# Patient Record
Sex: FEMALE | Race: WHITE | URBAN - METROPOLITAN AREA
[De-identification: names, ages, dates, MRNs, and addresses within clinical notes are randomized per-mention and may not be internally consistent; named-entity substitution may affect disease eponyms.]

---
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
## 2018-07-20 ENCOUNTER — OFFICE VISIT (OUTPATIENT)
Dept: OPHTHALMOLOGY | Facility: CLINIC | Age: 18
End: 2018-07-20
Payer: COMMERCIAL

## 2018-07-20 DIAGNOSIS — H52.223 REGULAR ASTIGMATISM OF BOTH EYES: ICD-10-CM

## 2018-07-20 DIAGNOSIS — H47.22: ICD-10-CM

## 2018-07-20 DIAGNOSIS — H52.13 SEVERE MYOPIA OF BOTH EYES: ICD-10-CM

## 2018-07-20 DIAGNOSIS — Z83.511 FAMILY HISTORY OF GLAUCOMA IN MOTHER: ICD-10-CM

## 2018-07-20 DIAGNOSIS — H40.1134 PRIMARY OPEN ANGLE GLAUCOMA OF BOTH EYES, INDETERMINATE STAGE: Primary | ICD-10-CM

## 2018-07-20 PROCEDURE — 92015 DETERMINE REFRACTIVE STATE: CPT | Performed by: OPHTHALMOLOGY

## 2018-07-20 PROCEDURE — 92014 COMPRE OPH EXAM EST PT 1/>: CPT | Performed by: OPHTHALMOLOGY

## 2018-07-20 PROCEDURE — 92133 CPTRZD OPH DX IMG PST SGM ON: CPT | Performed by: OPHTHALMOLOGY

## 2018-07-20 RX ORDER — LATANOPROST 50 UG/ML
SOLUTION/ DROPS OPHTHALMIC
Qty: 3 BOTTLE | Refills: 3 | Status: SHIPPED | OUTPATIENT
Start: 2018-07-20 | End: 2018-08-02

## 2018-07-20 NOTE — PATIENT INSTRUCTIONS
High myopia  New glasses. Same contacts    Autosomal dominant optic atrophy type 1  Stable    Astigmatism of both eyes  New glasses.  Same contacts    Family history of glaucoma in mother  Mom with glaucoma and AD optic atrophy    Primary open angle glaucom

## 2018-07-20 NOTE — PROGRESS NOTES
Darren Carney is a 21year old female. HPI:     HPI     EP/ 20 yo F here for complete exam.  LDE: 7/17/17  Last VF and OCT: 12/18/17    She has a hx of POAG OU and is taking Latanoprost OU qhs.   She also has a hx of autosomal dominant optic atrophy ty Tonometry (Applanation, 10:14 AM)       Right Left    Pressure 11 12          Pachymetry (7/27/2015)       Right Left    Thickness 566/-1 571/-1          Pupils       Pupils APD    Right PERRL None    Left PERRL None          Visual Fields       L High myopia  New glasses. Same contacts    Autosomal dominant optic atrophy type 1  Stable    Astigmatism of both eyes  New glasses.  Same contacts    Family history of glaucoma in mother  Mom with glaucoma and AD optic atrophy    Primary open angle gla

## 2018-08-02 RX ORDER — LATANOPROST 50 UG/ML
SOLUTION/ DROPS OPHTHALMIC
Qty: 2.5 ML | Refills: 11 | Status: SHIPPED | OUTPATIENT
Start: 2018-08-02 | End: 2019-07-26

## 2019-07-26 ENCOUNTER — OFFICE VISIT (OUTPATIENT)
Dept: OPHTHALMOLOGY | Facility: CLINIC | Age: 19
End: 2019-07-26
Payer: COMMERCIAL

## 2019-07-26 DIAGNOSIS — H52.13 SEVERE MYOPIA OF BOTH EYES: ICD-10-CM

## 2019-07-26 DIAGNOSIS — H47.22: ICD-10-CM

## 2019-07-26 DIAGNOSIS — H52.223 REGULAR ASTIGMATISM OF BOTH EYES: ICD-10-CM

## 2019-07-26 DIAGNOSIS — H40.1134 PRIMARY OPEN ANGLE GLAUCOMA OF BOTH EYES, INDETERMINATE STAGE: Primary | ICD-10-CM

## 2019-07-26 PROCEDURE — 92015 DETERMINE REFRACTIVE STATE: CPT | Performed by: OPHTHALMOLOGY

## 2019-07-26 PROCEDURE — 92133 CPTRZD OPH DX IMG PST SGM ON: CPT | Performed by: OPHTHALMOLOGY

## 2019-07-26 PROCEDURE — 92014 COMPRE OPH EXAM EST PT 1/>: CPT | Performed by: OPHTHALMOLOGY

## 2019-07-26 RX ORDER — LATANOPROST 50 UG/ML
SOLUTION/ DROPS OPHTHALMIC
Qty: 3 BOTTLE | Refills: 3 | Status: SHIPPED | OUTPATIENT
Start: 2019-07-26 | End: 2021-05-25

## 2019-07-26 RX ORDER — LATANOPROST 50 UG/ML
SOLUTION/ DROPS OPHTHALMIC
Qty: 2.5 ML | Refills: 11 | Status: SHIPPED | OUTPATIENT
Start: 2019-07-26 | End: 2020-08-03

## 2019-07-26 NOTE — ASSESSMENT & PLAN NOTE
IOP good today  -- 11/12. OCT shows change in Left eye. Follow up with Dr. Ramesh Stubbs. Emphasized use of Latanoprost every night. Patient should follow up with Dr. Ramesh Stubbs as planned on 8/6/19. Stressed she should see him twice a year.

## 2019-07-26 NOTE — PATIENT INSTRUCTIONS
Autosomal dominant optic atrophy type 1  Vision is stable, but OCT shows some slight change in Left eye. Primary open angle glaucoma of both eyes, indeterminate stage  IOP good today  -- 11/12. OCT shows change in Left eye. Follow up with Dr. Anabel Felix.  Em

## 2019-07-26 NOTE — PROGRESS NOTES
Colan Scheuermann is a 19year old female. HPI:     HPI     EP/ 20 yo F here for complete exam.  LDE: 7/20/18   Last VF: 12/18/17   Last OCT: 7/20/18    She has a hx of POAG OU and is taking Latanoprost OU qhs.  She says she last used the Latanoprost 3-4 d night into both eyes Disp: 3 Bottle Rfl: 3       Allergies:  No Known Allergies    ROS:       PHYSICAL EXAM:     Base Eye Exam     Visual Acuity (with CLS)       Right Left    Dist cc 20/40 -2 20/50 +1    Dist ph cc 20/40 20/40-    Correction:  Contacts 8.30 14.0 -9.50 20/40-2 [place]  20/40-2    Left Acuvue 2 8.30 14.0 -9.50 20/50+1 -0.75 Sphere 20/40-2          Final Contact Lens Rx       Brand Base Curve Diameter Sphere    Right Acuvue 2 8.30 14.0 -9.50    Left Acuvue 2 8.30 14.0 -10.00    Expiration [place]

## 2020-08-03 ENCOUNTER — OFFICE VISIT (OUTPATIENT)
Dept: OPHTHALMOLOGY | Facility: CLINIC | Age: 20
End: 2020-08-03
Payer: COMMERCIAL

## 2020-08-03 DIAGNOSIS — H52.203 ASTIGMATISM OF BOTH EYES, UNSPECIFIED TYPE: ICD-10-CM

## 2020-08-03 DIAGNOSIS — H40.1134 PRIMARY OPEN ANGLE GLAUCOMA OF BOTH EYES, INDETERMINATE STAGE: Primary | ICD-10-CM

## 2020-08-03 DIAGNOSIS — H52.13 SEVERE MYOPIA OF BOTH EYES: ICD-10-CM

## 2020-08-03 DIAGNOSIS — H47.22: ICD-10-CM

## 2020-08-03 PROCEDURE — 92133 CPTRZD OPH DX IMG PST SGM ON: CPT | Performed by: OPHTHALMOLOGY

## 2020-08-03 PROCEDURE — 92015 DETERMINE REFRACTIVE STATE: CPT | Performed by: OPHTHALMOLOGY

## 2020-08-03 PROCEDURE — 92014 COMPRE OPH EXAM EST PT 1/>: CPT | Performed by: OPHTHALMOLOGY

## 2020-08-03 RX ORDER — LATANOPROST 50 UG/ML
SOLUTION/ DROPS OPHTHALMIC
Qty: 2.5 ML | Refills: 11 | Status: SHIPPED | OUTPATIENT
Start: 2020-08-03 | End: 2020-09-09

## 2020-08-03 NOTE — ASSESSMENT & PLAN NOTE
IOP 11/11 adjusted 10/10  OCT stable  Continue Latanoprost at night both eyes  Follow up with Dr. Alycia Joseph

## 2020-08-03 NOTE — PATIENT INSTRUCTIONS
High myopia  New glasses and contacts    Autosomal dominant optic atrophy type 1    Stable    Primary open angle glaucoma of both eyes, indeterminate stage  IOP 11/11 adjusted 10/10  OCT stable  Continue Latanoprost at night both eyes  Follow up with Dr. Domonique Watson

## 2020-08-03 NOTE — PROGRESS NOTES
Joseph Gibson is a 22year old female. HPI:     HPI     EP/18 year old here for an OCT and complete exam LDE was 7/26/19  Last VF: 12/18/17   Last OCT: 7/26/19   She has a hx of POAG OU and is taking Latanoprost OU qhs.   She also has a hx of autosomal Allergies    ROS:       PHYSICAL EXAM:     Base Eye Exam     Visual Acuity (Snellen - Linear)       Right Left    Dist cc 20/40 +1 20/50 +2    Dist ph cc NI 20/40 +2    Near cc 20/25 20/30    Correction:  Contacts          Visual Acuity #2 (Snellen - Linea Contact Lens Exam     Current Contact Lens Rx       Brand Base Curve Diameter Sphere Dist VA Over-Sphere Over-Cyl Over-Dist VA    Right Acuvue 2 8.30 14.0 -9.50 20/40+1 +0.25 Sphere 20/40+1    Left Acuvue 2 8.30 14.0 -9.50 20/50+2 -0.25 Sphere 20/40-2

## 2020-09-09 RX ORDER — LATANOPROST 50 UG/ML
SOLUTION/ DROPS OPHTHALMIC
Qty: 3 BOTTLE | Refills: 3 | Status: SHIPPED | OUTPATIENT
Start: 2020-09-09 | End: 2021-07-12

## 2020-09-09 NOTE — TELEPHONE ENCOUNTER
CHUY 8/3/2020     Routed to [facility] out of the office until 9/11/2020.

## 2021-01-11 ENCOUNTER — TELEPHONE (OUTPATIENT)
Dept: OPHTHALMOLOGY | Facility: CLINIC | Age: 21
End: 2021-01-11

## 2021-01-11 NOTE — TELEPHONE ENCOUNTER
Spoke to mom, verified last CL Rx over the phone, she wanted to make sure patient was wearing her own CLs and has not been wearing her sisters CL Rx. Mom will call back if she has any other questions.

## 2021-01-21 ENCOUNTER — TELEPHONE (OUTPATIENT)
Dept: OPHTHALMOLOGY | Facility: CLINIC | Age: 21
End: 2021-01-21

## 2021-01-21 NOTE — TELEPHONE ENCOUNTER
Spoke to pt and had questions regarding her last contact lens Rx being -10.00 and the most recent Rx being a -9.50. she has not tried the new contacts yet. I advised her to try the contacts first before question them.  Everything was correct that we gave he

## 2021-05-25 ENCOUNTER — TELEPHONE (OUTPATIENT)
Dept: OPHTHALMOLOGY | Facility: CLINIC | Age: 21
End: 2021-05-25

## 2021-05-25 RX ORDER — LATANOPROST 50 UG/ML
SOLUTION/ DROPS OPHTHALMIC
Qty: 7.5 ML | Refills: 3 | Status: SHIPPED | OUTPATIENT
Start: 2021-05-25

## 2021-05-25 NOTE — TELEPHONE ENCOUNTER
Current Outpatient Medications   Medication Sig Dispense Refill   • latanoprost 0.005 % Ophthalmic Solution INSTILL 1 DROP BY OPHTHALMIC ROUTE EVERY NIGHT INTO BOTH EYES 3 Bottle 3   •   3 Bottle 3     Mom asking for this to be sent to [facility]
Rx pended to [facility] and pt is following up with Dr. Tierra Hernandez as directed.
Bella Perez(Attending)

## 2021-07-12 ENCOUNTER — OFFICE VISIT (OUTPATIENT)
Dept: OPHTHALMOLOGY | Facility: CLINIC | Age: 21
End: 2021-07-12
Payer: COMMERCIAL

## 2021-07-12 DIAGNOSIS — H52.13 SEVERE MYOPIA OF BOTH EYES: ICD-10-CM

## 2021-07-12 DIAGNOSIS — H47.22: ICD-10-CM

## 2021-07-12 DIAGNOSIS — H40.1134 PRIMARY OPEN ANGLE GLAUCOMA OF BOTH EYES, INDETERMINATE STAGE: Primary | ICD-10-CM

## 2021-07-12 PROCEDURE — 92012 INTRM OPH EXAM EST PATIENT: CPT | Performed by: OPHTHALMOLOGY

## 2021-07-12 PROCEDURE — 92133 CPTRZD OPH DX IMG PST SGM ON: CPT | Performed by: OPHTHALMOLOGY

## 2021-07-12 RX ORDER — LATANOPROST 50 UG/ML
SOLUTION/ DROPS OPHTHALMIC
Qty: 7.5 ML | Refills: 3 | Status: SHIPPED | OUTPATIENT
Start: 2021-07-12

## 2021-07-12 NOTE — PROGRESS NOTES
Shyam Anderson is a 23year old female. HPI:     HPI     EP/ 23 yr old here for an OCT and IOP check. LDE 8/3/2020 with Hx of POAG OU and is taking Latanoprost OU qhs.  ( Pt has not taken Latanoprost since June 26th. - she has refills at her pharmacy an route every night into both eyes 7.5 mL 3       Allergies:  No Known Allergies    ROS:       PHYSICAL EXAM:     Base Eye Exam     Visual Acuity (Snellen - Linear)       Right Left    Dist cc 20/40 20/60    Near cc 20/25 20/30    Correction: Contacts Latanoprost since June 26th. She will  her Latanoprost today. Last visit with Dr. Ivana Alexis was on June 28- stable results. Stressed the importance of taking Latanoprost; one drop in each eye at bedtime.      Autosomal dominant optic atrophy type 1

## 2021-07-12 NOTE — PATIENT INSTRUCTIONS
Primary open angle glaucoma of both eyes, indeterminate stage  OCT done in the office today showing stable results. IOP is slightly higher due to not taking Latanoprost since June 26th. She will  her Latanoprost today.   Last visit with Dr. Tamera boss

## 2021-07-12 NOTE — ASSESSMENT & PLAN NOTE
OCT done in the office today showing stable results. IOP is slightly higher due to not taking Latanoprost since June 26th. She will  her Latanoprost today. Last visit with Dr. Fannie Damon was on June 28- stable results.      Stressed the importance of t

## 2021-09-09 NOTE — PATIENT INSTRUCTIONS
High myopia  New Rx. Glasses and contacts. Primary open angle glaucoma of both eyes, indeterminate stage  IOP good on Latanoprost. OCT shows change in OS.  Continue Latanoprost.
Home

## 2022-01-05 ENCOUNTER — TELEPHONE (OUTPATIENT)
Dept: OPHTHALMOLOGY | Facility: CLINIC | Age: 22
End: 2022-01-05

## 2022-01-05 NOTE — TELEPHONE ENCOUNTER
Pt's mother called to request a copy of pt's contact rx. Pt wearing last pair. Pt leaves for college on Friday 1-7-22. Fax to caller's work number [phone]. Call when faxing.

## 2023-05-18 ENCOUNTER — TELEPHONE (OUTPATIENT)
Dept: OPHTHALMOLOGY | Facility: CLINIC | Age: 23
End: 2023-05-18

## 2023-05-18 NOTE — TELEPHONE ENCOUNTER
Spoke to pt's mom Clyde Jennings) and mom asked if pt can be seen by [facility] since she will be home from school. Scheduled pt on 6/23/23 @ 1pm with [facility] and mom asked if sister (Tomi Thurman) can also been seen that day.   Moved sister's appointment to 1:15pm on the same day per mom's request.

## 2023-05-18 NOTE — TELEPHONE ENCOUNTER
Per mother asking if pt can be seen between 6/11-7/2, states pt will be home from school. Please advise thank you.

## 2023-06-23 ENCOUNTER — OFFICE VISIT (OUTPATIENT)
Dept: OPHTHALMOLOGY | Facility: CLINIC | Age: 23
End: 2023-06-23

## 2023-06-23 DIAGNOSIS — H40.1134 PRIMARY OPEN ANGLE GLAUCOMA OF BOTH EYES, INDETERMINATE STAGE: Primary | ICD-10-CM

## 2023-06-23 DIAGNOSIS — H52.13 SEVERE MYOPIA OF BOTH EYES: ICD-10-CM

## 2023-06-23 DIAGNOSIS — H47.22: ICD-10-CM

## 2023-06-23 DIAGNOSIS — H52.223 REGULAR ASTIGMATISM OF BOTH EYES: ICD-10-CM

## 2023-06-23 PROCEDURE — 92133 CPTRZD OPH DX IMG PST SGM ON: CPT | Performed by: OPHTHALMOLOGY

## 2023-06-23 PROCEDURE — 92015 DETERMINE REFRACTIVE STATE: CPT | Performed by: OPHTHALMOLOGY

## 2023-06-23 PROCEDURE — 92014 COMPRE OPH EXAM EST PT 1/>: CPT | Performed by: OPHTHALMOLOGY

## 2023-06-23 RX ORDER — LAMOTRIGINE 200 MG/1
200 TABLET ORAL DAILY
COMMUNITY
Start: 2023-05-29

## 2023-06-23 RX ORDER — GABAPENTIN 100 MG/1
CAPSULE ORAL
COMMUNITY
Start: 2023-04-17

## 2023-06-23 NOTE — PATIENT INSTRUCTIONS
Primary open angle glaucoma of both eyes, indeterminate stage  OCT done in the office today showing stable results. IOP 14/13 adjusted 13/12  Continue Latanoprost 1 drop at night to each eye. Under the care of Dr. Silvana Durham. Saw him on  6/19/23 and will see in 6 months. Autosomal dominant optic atrophy type 1    Stable OCT. High myopia  New  glasses and contacts. Astigmatism of both eyes  New glasses and contacts.

## 2023-06-23 NOTE — ASSESSMENT & PLAN NOTE
OCT done in the office today showing stable results. IOP 14/13 adjusted 13/12  Continue Latanoprost 1 drop at night to each eye. Under the care of Dr. Francisco J Carpenter. Saw him on  6/19/23 and will see in 6 months.

## 2023-06-26 NOTE — PROGRESS NOTES
Hector Paz is a 23year old female. HPI:     HPI    EP/ 23year old here for a complete exam.  LDE 8/3/2020 she was last seen on 7/12/21 with Hx of POAG OU and is taking Latanoprost OU qhs. She also has a hx of autosomal dominant optic atrophy type 1 and high myopia OU. She was last seen by Dr. Jessica Berry on 6/19/23. She will follow-up with him in 6 months. Pt states that her vision is stable and is happy with her glasses and contacts. Last edited by Gregg Ziegler OT on 6/23/2023  1:27 PM.        Patient History:  Past Medical History:   Diagnosis Date    Astigmatism of both eyes 7/27/2015    Autosomal dominant optic atrophy type 1 7/27/2015    Disorder of eye, unspecified 2004    OU-per: NG-Probable autoromal Dom. Optic Atrophy    Family history of glaucoma in mother 7/27/2015    Glaucoma suspect of both eyes 7/27/2015    High myopia 7/27/2015    Lipid screening 6/8/2013    per: NG    Myopia of both eyes with astigmatism 2001    OU    Right fibular fracture 3/2009    surgery       Surgical History: Hector Paz has no past surgical history on file. Family History   Problem Relation Age of Onset    Diabetes Other         multiple    Eye Problems Mother         Optic atrophy    Diabetes Mother     Glaucoma Mother     Eye Problems Father         Refractive Error-s/p RK    Diabetes Father     Macular degeneration Neg        Social History:   Social History     Socioeconomic History    Marital status: Single   Tobacco Use    Smoking status: Never    Smokeless tobacco: Never   Substance and Sexual Activity    Alcohol use: No    Drug use: No   Other Topics Concern    Caffeine Concern Yes       Medications:  Current Outpatient Medications   Medication Sig Dispense Refill    gabapentin 100 MG Oral Cap TAKE 1-2 TABS BY MOUTH EVERY 6 HOURS AS NEEDED FOR SEVERE ANXIETY      lamoTRIgine 200 MG Oral Tab Take 1 tablet (200 mg total) by mouth daily.       latanoprost 0.005 % Ophthalmic Solution INSTILL 1 DROP BY OPHTHALMIC ROUTE EVERY NIGHT INTO BOTH EYES 7.5 mL 3       Allergies:  No Known Allergies    ROS:       PHYSICAL EXAM:     Base Eye Exam       Visual Acuity (Snellen - Linear)         Right Left    Dist cc 20/40 20/50 +2    Near cc 20/25 20/25      Correction: Contacts              Tonometry (Applanation, 2:00 PM)         Right Left    Pressure 14 13              Pachymetry (7/27/2015)         Right Left    Thickness 566/-1 571/-1              Pupils         Pupils APD    Right PERRL None    Left PERRL None              Visual Fields (Counting fingers)         Left Right     Full Full              Extraocular Movement         Right Left     Full Full              Dilation       Both eyes: 1.0% Mydriacyl and 2.5% Wang Synephrine @ 2:00 PM                  Strabismus Exam       Distance Near Near +3DS N Bifocals    Ortho  X'                    Slit Lamp and Fundus Exam       External Exam         Right Left    External Normal Normal              Slit Lamp Exam         Right Left    Lids/Lashes Normal Normal    Conjunctiva/Sclera Injection Injection    Cornea Clear Clear    Anterior Chamber Deep and quiet Deep and quiet    Iris Normal Normal    Lens Clear Clear    Vitreous Clear Clear              Fundus Exam         Right Left    Disc Temporal optic atrophy  Temporal optic atrophy     C/D Ratio 0.4 0.4    Macula Normal Normal    Vessels Normal Normal    Periphery Normal Normal                  Refraction       Wearing Rx         Sphere Cylinder Axis    Right -12.50 +1.75 105    Left -12.50 +0.75 075      Type: Single vision              Cycloplegic Refraction         Sphere Cylinder [place] Dist VA    Right -13.00 +1.75 105 20/40+    Left -13.25 +0.75 075 20/40+              Final Rx         Sphere Cylinder [place] Dist VA    Right -13.00 +1.75 105 20/40+    Left -13.25 +0.75 075 20/40+      Type: Single vision                  Contact Lens Exam       Current Contact Lens Rx         Brand Base Curve Diameter Sphere Dist VA Over-Sphere Over-Dist VA    Right Acuvue 2 8.30 14.0 -9.50 20/40 -0.50 20/40    Left Acuvue 2 8.30 14.0 -10.00 20/50+ -1.00 20/40              Final Contact Lens Rx         Brand Base Curve Diameter Sphere    Right Acuvue 2 8.30 14.0 -10.00    Left Acuvue 2 8.30 14.0 -11.00      Expiration Date: 6/23/2025                     ASSESSMENT/PLAN:     Diagnoses and Plan:     Primary open angle glaucoma of both eyes, indeterminate stage  OCT done in the office today showing stable results. IOP 14/13 adjusted 13/12  Continue Latanoprost 1 drop at night to each eye. Under the care of Dr. Robinson River. Saw him on  6/19/23 and will see in 6 months. Autosomal dominant optic atrophy type 1    Stable OCT. High myopia  New  glasses and contacts. Astigmatism of both eyes  New glasses and contacts. Orders Placed This Encounter      OCT, Optic Nerve - OU - Both Eyes      Meds This Visit:  Requested Prescriptions      No prescriptions requested or ordered in this encounter        Follow up instructions:  Return in about 1 year (around 6/23/2024), or if symptoms worsen or fail to improve, for Complete exam See Dr. Robinson River as directed. Every 6 months. .    6/26/2023  Scribed by: Thea Reyes MD

## (undated) NOTE — MR AVS SNAPSHOT
[address]  [phone]               Thank you for choosing us for your health care visit with [facility].  Melchor Jackson MD.  We are glad to serve you and happy to provide you with this castillo Sign up for [employer], your secure online medical record. [employer] will allow you to access patient instructions from your recent visit,  view other health information, and more. To sign up or find more information, go to [email or web address]